# Patient Record
Sex: FEMALE | Race: BLACK OR AFRICAN AMERICAN | NOT HISPANIC OR LATINO | Employment: UNEMPLOYED | URBAN - METROPOLITAN AREA
[De-identification: names, ages, dates, MRNs, and addresses within clinical notes are randomized per-mention and may not be internally consistent; named-entity substitution may affect disease eponyms.]

---

## 2019-08-15 ENCOUNTER — HOSPITAL ENCOUNTER (EMERGENCY)
Facility: OTHER | Age: 24
Discharge: HOME | End: 2019-08-15
Attending: EMERGENCY MEDICINE

## 2019-08-15 VITALS
WEIGHT: 147 LBS | OXYGEN SATURATION: 100 % | RESPIRATION RATE: 18 BRPM | BODY MASS INDEX: 23.63 KG/M2 | SYSTOLIC BLOOD PRESSURE: 104 MMHG | HEIGHT: 66 IN | HEART RATE: 80 BPM | TEMPERATURE: 97 F | DIASTOLIC BLOOD PRESSURE: 59 MMHG

## 2019-08-15 DIAGNOSIS — R42 LIGHTHEADED: Primary | ICD-10-CM

## 2019-08-15 LAB
B-HCG UR QL: NEGATIVE
CTP QC/QA: YES

## 2019-08-15 PROCEDURE — 93005 ELECTROCARDIOGRAM TRACING: CPT

## 2019-08-15 PROCEDURE — 99283 EMERGENCY DEPT VISIT LOW MDM: CPT | Mod: 25

## 2019-08-15 PROCEDURE — 81025 URINE PREGNANCY TEST: CPT | Performed by: EMERGENCY MEDICINE

## 2019-08-15 PROCEDURE — 93010 EKG 12-LEAD: ICD-10-PCS | Mod: ,,, | Performed by: INTERNAL MEDICINE

## 2019-08-15 PROCEDURE — 93010 ELECTROCARDIOGRAM REPORT: CPT | Mod: ,,, | Performed by: INTERNAL MEDICINE

## 2019-08-16 NOTE — ED PROVIDER NOTES
"Encounter Date: 8/15/2019       History     Chief Complaint   Patient presents with    Feels Weird     Pt came to the ED tonight c.o feeling weird s/p getting a drink at 530 pm, pt states 2 hrs after drinking the alcoholic beverage pt having symptoms of "Throat feeling hot, hard to take a deep breath" Pt am bulatory with no assistance     24-year-old female who reports no past medical history presents emergency department via EMS secondary to feeling weird after drinking an unknown alcoholic beverage from a stranger around 5:30 p.m..  She does admit that she received a drink from a .  She denies syncope, confusion, nausea, vomiting.  She states that she felt like she needed to pass out but she was already sitting down.  She states that her throat felt hot and felt like it was hard to take a deep breath. Denies any trauma or injury.  No current treatment.  On my evaluation patient states that she is starting to feel better.    The history is provided by the patient and the EMS personnel.     Review of patient's allergies indicates:  No Known Allergies  History reviewed. No pertinent past medical history.  History reviewed. No pertinent surgical history.  History reviewed. No pertinent family history.  Social History     Tobacco Use    Smoking status: Never Smoker   Substance Use Topics    Alcohol use: Not Currently    Drug use: Yes     Review of Systems   Constitutional: Negative for chills and fever.   HENT: Negative for facial swelling, sore throat and trouble swallowing.         Warmth feeling to throat   Respiratory: Positive for shortness of breath. Negative for cough, chest tightness and wheezing.    Cardiovascular: Negative for chest pain.   Gastrointestinal: Negative for abdominal pain, nausea and vomiting.   Genitourinary: Negative for dysuria.   Musculoskeletal: Negative for back pain.   Skin: Negative for rash.   Neurological: Positive for weakness and light-headedness.   Hematological: Does " not bruise/bleed easily.       Physical Exam     Initial Vitals [08/15/19 1940]   BP Pulse Resp Temp SpO2   (!) 94/55 74 18 97.4 °F (36.3 °C) 100 %      MAP       --         Physical Exam    Nursing note and vitals reviewed.  Constitutional: She appears well-developed and well-nourished. She is not diaphoretic.  Non-toxic appearance. No distress.   Answering questions appropriately.  Clinically sober on exam.  Ambulatory with steady gait   HENT:   Head: Normocephalic and atraumatic.   Right Ear: External ear normal.   Left Ear: External ear normal.   Nose: Nose normal.   Mouth/Throat: Uvula is midline, oropharynx is clear and moist and mucous membranes are normal. Mucous membranes are not dry. No trismus in the jaw. No uvula swelling. No oropharyngeal exudate, posterior oropharyngeal edema, posterior oropharyngeal erythema or tonsillar abscesses.   Eyes: EOM and lids are normal. Pupils are equal, round, and reactive to light. Right conjunctiva is injected. Left conjunctiva is injected. No scleral icterus. Right eye exhibits normal extraocular motion and no nystagmus. Left eye exhibits normal extraocular motion and no nystagmus. Right pupil is round and reactive. Left pupil is round and reactive. Pupils are equal.   Neck: Normal range of motion and phonation normal. Neck supple.   Cardiovascular: Normal rate, regular rhythm, normal heart sounds, intact distal pulses and normal pulses. Exam reveals no gallop, no friction rub and no decreased pulses.    No murmur heard.  Pulses:       Radial pulses are 2+ on the right side, and 2+ on the left side.        Dorsalis pedis pulses are 2+ on the right side, and 2+ on the left side.   Pulmonary/Chest: Effort normal and breath sounds normal. No respiratory distress. She has no decreased breath sounds. She has no wheezes. She has no rhonchi. She has no rales.   Abdominal: Soft. Normal appearance and bowel sounds are normal. She exhibits no distension. There is no tenderness.  There is no rebound and no guarding.   Musculoskeletal: Normal range of motion.   No obvious deformities, moving all extremities, normal gait   Neurological: She is alert and oriented to person, place, and time. She has normal strength. She displays no atrophy. No cranial nerve deficit or sensory deficit. She exhibits normal muscle tone. Gait normal. GCS eye subscore is 4. GCS verbal subscore is 5. GCS motor subscore is 6.   Negative pronator drift   Skin: Skin is warm, dry and intact. No lesion and no rash noted. No erythema.   Psychiatric: She has a normal mood and affect. Her speech is normal and behavior is normal. Judgment normal. Cognition and memory are normal.         ED Course   Procedures  Labs Reviewed   POCT URINE PREGNANCY     EKG Readings: (Independently Interpreted)   Initial Reading: No STEMI. Rhythm: Normal Sinus Rhythm. Heart Rate: 70. Ectopy: No Ectopy. Conduction: Normal. ST Segments: Normal ST Segments. T Waves: Normal. Clinical Impression: Normal Sinus Rhythm       Imaging Results    None          Medical Decision Making:   History:   I obtained history from: EMS provider.  Initial Assessment:   24-year-old female with complaints of lightheadedness.  Afebrile neurovascularly intact. Blood pressure is 94/55 on arrival.  Patient does have injected conjunctiva bilaterally however exam is otherwise benign.  She is ambulatory in the emergency department with a steady gait.  Clinically sober despite admitting to drinking alcoholic beverage and feeling weird after ingestion.  Lungs are clear and she has equal strength bilaterally.  No signs of trauma or injury. She denies syncope and neuro exam is benign.  No previous records to compare vitals. Hypotensive in the ED. Unsure if this is baseline. Patient complaining of lightheadedness.  Clinical Tests:   Lab Tests: Ordered and Reviewed  Medical Tests: Ordered and Reviewed  ED Management:  Refused POCT glucose. States that it was obtained by EMS. EMS  reported glucose of 99.  Patient refusing IV fluids or blood draw despite stating that she feels dehydrated.  Patient states I can drink water.  Given oral fluids.  Of note she is also eating ice from and ice pack that was administered to her while waiting for room in the [place].  EKG was obtained with no evidence of acute ischemia or STEMI.  Consistent with normal sinus rhythm at a rate of 70 beats per minute. Refusing any further workup.  States that she has someone waiting for her outside and she would like to leave.  Drinking p.o. fluids.  Repeat blood pressure is 106/59.  Will discharge home with care instructions.  She is urged not to drink/eat anything from strangers and avoid alcoholic beverages at this time.  She is urged oral hydration and rest.  Clinically sober and ambulatory with steady gait in the emergency department.  She is urged to follow up with primary care given information for [facility].  She is to return to the emergency department for any worsening signs or symptoms otherwise.  She states understanding and agrees with this plan.  This is the extent of patient's complaints today.  Other:   I have discussed this case with another health care provider.       <> Summary of the Discussion: [place]  This note was created using [employer] dictation.  There may be typographical errors secondary to dictation.                        Clinical Impression:     1. Lightheaded            Disposition:   Disposition: Discharged  Condition: Stable                        Aislinn White PA-C  08/15/19 1942

## 2019-08-16 NOTE — ED NOTES
"Patient refuse IV catheter and IV fluids at this time, she states, "I don't want any needles at this time, I can drink about 5 glasses of water." Provider aware.  "

## 2019-08-16 NOTE — ED NOTES
Pt denying POCT glucose at this time. Pt able to walk from EMS stretcher to wheelchair. Pt has blankets over head at this time

## 2019-08-16 NOTE — ED TRIAGE NOTES
"Pt presents to ED with c/o of feeling super tired and "maybe dehydrated". Reports she was at the bus stop and then started feeling "whoozy and lighted with a headache all over". Denies any N/V/D, fever, dizziness, and visual changes.   "